# Patient Record
Sex: FEMALE | Race: BLACK OR AFRICAN AMERICAN | NOT HISPANIC OR LATINO | Employment: FULL TIME | URBAN - METROPOLITAN AREA
[De-identification: names, ages, dates, MRNs, and addresses within clinical notes are randomized per-mention and may not be internally consistent; named-entity substitution may affect disease eponyms.]

---

## 2023-12-29 ENCOUNTER — PROCEDURE VISIT (OUTPATIENT)
Dept: OBSTETRICS AND GYNECOLOGY | Facility: CLINIC | Age: 23
End: 2023-12-29
Payer: COMMERCIAL

## 2023-12-29 ENCOUNTER — PHARMACY VISIT (OUTPATIENT)
Dept: PHARMACY | Facility: CLINIC | Age: 23
End: 2023-12-29
Payer: MEDICAID

## 2023-12-29 VITALS
HEIGHT: 59 IN | HEART RATE: 83 BPM | WEIGHT: 111.9 LBS | SYSTOLIC BLOOD PRESSURE: 110 MMHG | DIASTOLIC BLOOD PRESSURE: 72 MMHG | BODY MASS INDEX: 22.56 KG/M2

## 2023-12-29 DIAGNOSIS — Z11.3 SCREEN FOR STD (SEXUALLY TRANSMITTED DISEASE): ICD-10-CM

## 2023-12-29 DIAGNOSIS — Z30.46 ENCOUNTER FOR REMOVAL AND REINSERTION OF NEXPLANON: Primary | ICD-10-CM

## 2023-12-29 DIAGNOSIS — A74.9 CHLAMYDIA: ICD-10-CM

## 2023-12-29 DIAGNOSIS — Z30.46 ENCOUNTER FOR SURVEILLANCE OF IMPLANTABLE SUBDERMAL CONTRACEPTIVE: ICD-10-CM

## 2023-12-29 DIAGNOSIS — A59.9 TRICHOMONAS INFECTION: ICD-10-CM

## 2023-12-29 DIAGNOSIS — N89.8 VAGINAL DISCHARGE: ICD-10-CM

## 2023-12-29 PROCEDURE — 87205 SMEAR GRAM STAIN: CPT | Performed by: ADVANCED PRACTICE MIDWIFE

## 2023-12-29 PROCEDURE — 87800 DETECT AGNT MULT DNA DIREC: CPT | Performed by: ADVANCED PRACTICE MIDWIFE

## 2023-12-29 PROCEDURE — 2500000004 HC RX 250 GENERAL PHARMACY W/ HCPCS (ALT 636 FOR OP/ED): Mod: SE | Performed by: ADVANCED PRACTICE MIDWIFE

## 2023-12-29 PROCEDURE — 99213 OFFICE O/P EST LOW 20 MIN: CPT | Performed by: ADVANCED PRACTICE MIDWIFE

## 2023-12-29 PROCEDURE — RXMED WILLOW AMBULATORY MEDICATION CHARGE

## 2023-12-29 PROCEDURE — 87661 TRICHOMONAS VAGINALIS AMPLIF: CPT | Mod: 59 | Performed by: ADVANCED PRACTICE MIDWIFE

## 2023-12-29 RX ORDER — IBUPROFEN 200 MG
TABLET ORAL EVERY 12 HOURS
COMMUNITY
Start: 2018-09-22 | End: 2024-01-23

## 2023-12-29 RX ORDER — ETONOGESTREL 68 MG/1
IMPLANT SUBCUTANEOUS
COMMUNITY

## 2023-12-29 RX ORDER — IBUPROFEN 600 MG/1
600 TABLET ORAL ONCE
Status: DISCONTINUED | OUTPATIENT
Start: 2023-12-29 | End: 2024-01-23

## 2023-12-29 RX ORDER — IBUPROFEN 600 MG/1
600 TABLET ORAL EVERY 6 HOURS PRN
Qty: 30 TABLET | Refills: 0 | Status: SHIPPED | OUTPATIENT
Start: 2023-12-29 | End: 2024-01-08

## 2023-12-29 RX ADMIN — ETONOGESTREL 1 EACH: 68 IMPLANT SUBCUTANEOUS at 15:31

## 2023-12-29 ASSESSMENT — ENCOUNTER SYMPTOMS
NEUROLOGICAL NEGATIVE: 1
RESPIRATORY NEGATIVE: 1
GASTROINTESTINAL NEGATIVE: 1
HEMATOLOGIC/LYMPHATIC NEGATIVE: 1
ENDOCRINE NEGATIVE: 1
CONSTITUTIONAL NEGATIVE: 1
MUSCULOSKELETAL NEGATIVE: 1
EYES NEGATIVE: 1
ALLERGIC/IMMUNOLOGIC NEGATIVE: 1
PSYCHIATRIC NEGATIVE: 1
CARDIOVASCULAR NEGATIVE: 1

## 2023-12-29 ASSESSMENT — PAIN SCALES - GENERAL: PAINLEVEL: 0-NO PAIN

## 2023-12-29 NOTE — PROGRESS NOTES
Patient ID: Reyes Manjarrez is a 23 y.o. female.    Subjective   Patient ID: Reyes Manjarrez is a 23 y.o. female who presents for No chief complaint on file..  HPI  Vaginal discharge x __ days    3 painful bumps on vulva    Desires STI testing    Nexplanon placed in 3/2020 by Onelia Davila       Review of Systems   Constitutional: Negative.    HENT: Negative.     Eyes: Negative.    Respiratory: Negative.     Cardiovascular: Negative.    Gastrointestinal: Negative.    Endocrine: Negative.    Genitourinary:  Positive for genital sores and vaginal discharge.   Musculoskeletal: Negative.    Skin: Negative.    Allergic/Immunologic: Negative.    Neurological: Negative.    Hematological: Negative.    Psychiatric/Behavioral: Negative.         Objective   Physical Exam    Patient ID: Reyes Manjarrez is a 23 y.o. female.    Procedures    Assessment/Plan   Problem List Items Addressed This Visit    None  Visit Diagnoses         Codes    Screen for STD (sexually transmitted disease)    -  Primary Z11.3    Relevant Orders    C. Trachomatis / N. Gonorrhoeae, Amplified Detection    HIV 1/2 Antigen/Antibody Screen with Reflex to Confirmation    Hepatitis B Surface Antigen    Hepatitis C Antibody    Syphilis Screen with Reflex    TRICH VAGINALIS, AMPLIFIED                 RITIKA Hannon 12/29/23 2:16 PM

## 2023-12-30 LAB
C TRACH RRNA SPEC QL NAA+PROBE: POSITIVE
N GONORRHOEA DNA SPEC QL PROBE+SIG AMP: POSITIVE
T VAGINALIS RRNA SPEC QL NAA+PROBE: POSITIVE

## 2024-01-01 LAB
CLUE CELLS VAG LPF-#/AREA: PRESENT /[LPF]
NUGENT SCORE: 8
YEAST VAG WET PREP-#/AREA: ABNORMAL

## 2024-01-02 ENCOUNTER — TELEPHONE (OUTPATIENT)
Dept: OBSTETRICS AND GYNECOLOGY | Facility: CLINIC | Age: 24
End: 2024-01-02
Payer: COMMERCIAL

## 2024-01-02 PROCEDURE — RXMED WILLOW AMBULATORY MEDICATION CHARGE

## 2024-01-02 RX ORDER — DOXYCYCLINE 100 MG/1
100 CAPSULE ORAL 2 TIMES DAILY
Qty: 14 CAPSULE | Refills: 0 | Status: SHIPPED | OUTPATIENT
Start: 2024-01-02 | End: 2024-01-10

## 2024-01-02 RX ORDER — METRONIDAZOLE 500 MG/1
500 TABLET ORAL 2 TIMES DAILY
Qty: 14 TABLET | Refills: 0 | Status: SHIPPED | OUTPATIENT
Start: 2024-01-02 | End: 2024-01-10

## 2024-01-02 NOTE — TELEPHONE ENCOUNTER
TELEPHONE CALL TO PATIENT  Identified by name and date of birth.  Patient notified of result, treatment, partner treatment  Partner has own provider for treatment  Instructed no unprotected sex x 7 days after treatment  Encouraged use of condoms routinely  Will be in tomorrow to get injection and  medications at the pharmacy  Patient verbalizes understanding

## 2024-01-02 NOTE — TELEPHONE ENCOUNTER
----- Message from Celestina Merida, APRN-CNM sent at 1/2/2024  9:58 AM EST -----  Reyes has 3 stis she needs to come for injection for gonorrhea all other meds are sent to her pharmcay

## 2024-01-03 ENCOUNTER — PHARMACY VISIT (OUTPATIENT)
Dept: PHARMACY | Facility: CLINIC | Age: 24
End: 2024-01-03
Payer: MEDICAID

## 2024-01-03 ENCOUNTER — OFFICE VISIT (OUTPATIENT)
Dept: OBSTETRICS AND GYNECOLOGY | Facility: CLINIC | Age: 24
End: 2024-01-03
Payer: COMMERCIAL

## 2024-01-03 DIAGNOSIS — A54.9 GONORRHEA: Primary | ICD-10-CM

## 2024-01-03 DIAGNOSIS — B37.9 YEAST INFECTION: ICD-10-CM

## 2024-01-03 DIAGNOSIS — Z11.3 SCREEN FOR STD (SEXUALLY TRANSMITTED DISEASE): ICD-10-CM

## 2024-01-03 PROCEDURE — 1036F TOBACCO NON-USER: CPT | Performed by: ADVANCED PRACTICE MIDWIFE

## 2024-01-03 PROCEDURE — 2500000004 HC RX 250 GENERAL PHARMACY W/ HCPCS (ALT 636 FOR OP/ED): Mod: SE | Performed by: ADVANCED PRACTICE MIDWIFE

## 2024-01-03 RX ORDER — CEFTRIAXONE 500 MG/1
500 INJECTION, POWDER, FOR SOLUTION INTRAMUSCULAR; INTRAVENOUS ONCE
Status: COMPLETED | OUTPATIENT
Start: 2024-01-03 | End: 2024-01-03

## 2024-01-03 RX ORDER — FLUCONAZOLE 150 MG/1
150 TABLET ORAL ONCE
Qty: 2 TABLET | Refills: 0 | Status: SHIPPED | OUTPATIENT
Start: 2024-01-03 | End: 2024-01-03

## 2024-01-03 RX ADMIN — CEFTRIAXONE SODIUM 500 MG: 500 INJECTION, POWDER, FOR SOLUTION INTRAMUSCULAR; INTRAVENOUS at 09:54

## 2024-01-03 ASSESSMENT — PATIENT HEALTH QUESTIONNAIRE - PHQ9
SUM OF ALL RESPONSES TO PHQ9 QUESTIONS 1 & 2: 0
2. FEELING DOWN, DEPRESSED OR HOPELESS: NOT AT ALL
1. LITTLE INTEREST OR PLEASURE IN DOING THINGS: NOT AT ALL

## 2024-01-03 NOTE — PROGRESS NOTES
Here for GC treatment  Tested positive for GC, CT, Trich, BV at 12/29 visit  Will  prescriptions today  Partner has been notified and will get treated  Discussed no unprotected sex x 7 days after treatment  Requesting Condom prescription , and Diflucan     Ceftriaxone 500mg IM given  Tolerated well  NKDA

## 2024-01-22 ENCOUNTER — APPOINTMENT (OUTPATIENT)
Dept: RADIOLOGY | Facility: HOSPITAL | Age: 24
End: 2024-01-22
Payer: COMMERCIAL

## 2024-01-22 ENCOUNTER — HOSPITAL ENCOUNTER (EMERGENCY)
Facility: HOSPITAL | Age: 24
Discharge: HOME | End: 2024-01-23
Attending: EMERGENCY MEDICINE
Payer: COMMERCIAL

## 2024-01-22 VITALS
WEIGHT: 100 LBS | SYSTOLIC BLOOD PRESSURE: 110 MMHG | OXYGEN SATURATION: 100 % | HEIGHT: 59 IN | BODY MASS INDEX: 20.16 KG/M2 | TEMPERATURE: 100 F | DIASTOLIC BLOOD PRESSURE: 54 MMHG | HEART RATE: 89 BPM | RESPIRATION RATE: 20 BRPM

## 2024-01-22 DIAGNOSIS — R52 BODY ACHES: Primary | ICD-10-CM

## 2024-01-22 LAB
ALBUMIN SERPL BCP-MCNC: 3.8 G/DL (ref 3.4–5)
ALP SERPL-CCNC: 64 U/L (ref 33–110)
ALT SERPL W P-5'-P-CCNC: 6 U/L (ref 7–45)
ANION GAP BLDV CALCULATED.4IONS-SCNC: ABNORMAL MMOL/L
ANION GAP SERPL CALC-SCNC: 14 MMOL/L (ref 10–20)
APPEARANCE UR: ABNORMAL
AST SERPL W P-5'-P-CCNC: 20 U/L (ref 9–39)
BASE EXCESS BLDV CALC-SCNC: -1.3 MMOL/L (ref -2–3)
BASOPHILS # BLD AUTO: 0.03 X10*3/UL (ref 0–0.1)
BASOPHILS NFR BLD AUTO: 0.3 %
BILIRUB SERPL-MCNC: 1.1 MG/DL (ref 0–1.2)
BILIRUB UR STRIP.AUTO-MCNC: NEGATIVE MG/DL
BODY TEMPERATURE: 37 DEGREES CELSIUS
BUN SERPL-MCNC: 13 MG/DL (ref 6–23)
CA-I BLDV-SCNC: ABNORMAL MMOL/L
CALCIUM SERPL-MCNC: 9.2 MG/DL (ref 8.6–10.6)
CARDIAC TROPONIN I PNL SERPL HS: <3 NG/L (ref 0–34)
CHLORIDE BLDV-SCNC: 107 MMOL/L (ref 98–107)
CHLORIDE SERPL-SCNC: 106 MMOL/L (ref 98–107)
CK SERPL-CCNC: 93 U/L (ref 0–215)
CO2 SERPL-SCNC: 22 MMOL/L (ref 21–32)
COLOR UR: YELLOW
CREAT SERPL-MCNC: 0.73 MG/DL (ref 0.5–1.05)
EGFRCR SERPLBLD CKD-EPI 2021: >90 ML/MIN/1.73M*2
EOSINOPHIL # BLD AUTO: 0.03 X10*3/UL (ref 0–0.7)
EOSINOPHIL NFR BLD AUTO: 0.3 %
ERYTHROCYTE [DISTWIDTH] IN BLOOD BY AUTOMATED COUNT: 14.6 % (ref 11.5–14.5)
FLUAV RNA RESP QL NAA+PROBE: NOT DETECTED
FLUBV RNA RESP QL NAA+PROBE: NOT DETECTED
GLUCOSE BLDV-MCNC: 90 MG/DL (ref 74–99)
GLUCOSE SERPL-MCNC: 88 MG/DL (ref 74–99)
GLUCOSE UR STRIP.AUTO-MCNC: NEGATIVE MG/DL
HCO3 BLDV-SCNC: 24.3 MMOL/L (ref 22–26)
HCT VFR BLD AUTO: 38.5 % (ref 36–46)
HCT VFR BLD EST: 41 % (ref 36–46)
HGB BLD-MCNC: 13.4 G/DL (ref 12–16)
HGB BLDV-MCNC: 13.8 G/DL (ref 12–16)
IMM GRANULOCYTES # BLD AUTO: 0.03 X10*3/UL (ref 0–0.7)
IMM GRANULOCYTES NFR BLD AUTO: 0.3 % (ref 0–0.9)
INHALED O2 CONCENTRATION: 21 %
KETONES UR STRIP.AUTO-MCNC: ABNORMAL MG/DL
LACTATE BLDV-SCNC: 1.8 MMOL/L (ref 0.4–2)
LEUKOCYTE ESTERASE UR QL STRIP.AUTO: ABNORMAL
LIPASE SERPL-CCNC: 10 U/L (ref 9–82)
LYMPHOCYTES # BLD AUTO: 0.94 X10*3/UL (ref 1.2–4.8)
LYMPHOCYTES NFR BLD AUTO: 8.5 %
MAGNESIUM SERPL-MCNC: 1.75 MG/DL (ref 1.6–2.4)
MCH RBC QN AUTO: 28.8 PG (ref 26–34)
MCHC RBC AUTO-ENTMCNC: 34.8 G/DL (ref 32–36)
MCV RBC AUTO: 83 FL (ref 80–100)
MONOCYTES # BLD AUTO: 0.74 X10*3/UL (ref 0.1–1)
MONOCYTES NFR BLD AUTO: 6.7 %
MUCOUS THREADS #/AREA URNS AUTO: NORMAL /LPF
NEUTROPHILS # BLD AUTO: 9.23 X10*3/UL (ref 1.2–7.7)
NEUTROPHILS NFR BLD AUTO: 83.9 %
NITRITE UR QL STRIP.AUTO: NEGATIVE
NRBC BLD-RTO: 0 /100 WBCS (ref 0–0)
OXYHGB MFR BLDV: 43.5 % (ref 45–75)
PCO2 BLDV: 43 MM HG (ref 41–51)
PH BLDV: 7.36 PH (ref 7.33–7.43)
PH UR STRIP.AUTO: 6 [PH]
PLATELET # BLD AUTO: 199 X10*3/UL (ref 150–450)
PO2 BLDV: 37 MM HG (ref 35–45)
POTASSIUM BLDV-SCNC: ABNORMAL MMOL/L
POTASSIUM SERPL-SCNC: 4.2 MMOL/L (ref 3.5–5.3)
PROT SERPL-MCNC: 6.9 G/DL (ref 6.4–8.2)
PROT UR STRIP.AUTO-MCNC: ABNORMAL MG/DL
RBC # BLD AUTO: 4.66 X10*6/UL (ref 4–5.2)
RBC # UR STRIP.AUTO: ABNORMAL /UL
RBC #/AREA URNS AUTO: NORMAL /HPF
RSV RNA RESP QL NAA+PROBE: NOT DETECTED
SAO2 % BLDV: 45 % (ref 45–75)
SARS-COV-2 RNA RESP QL NAA+PROBE: NOT DETECTED
SODIUM BLDV-SCNC: 126 MMOL/L (ref 136–145)
SODIUM SERPL-SCNC: 138 MMOL/L (ref 136–145)
SP GR UR STRIP.AUTO: 1.03
SQUAMOUS #/AREA URNS AUTO: NORMAL /HPF
UROBILINOGEN UR STRIP.AUTO-MCNC: <2 MG/DL
WBC # BLD AUTO: 11 X10*3/UL (ref 4.4–11.3)
WBC #/AREA URNS AUTO: NORMAL /HPF

## 2024-01-22 PROCEDURE — 71046 X-RAY EXAM CHEST 2 VIEWS: CPT

## 2024-01-22 PROCEDURE — 2500000004 HC RX 250 GENERAL PHARMACY W/ HCPCS (ALT 636 FOR OP/ED): Mod: SE | Performed by: STUDENT IN AN ORGANIZED HEALTH CARE EDUCATION/TRAINING PROGRAM

## 2024-01-22 PROCEDURE — 83735 ASSAY OF MAGNESIUM: CPT | Performed by: STUDENT IN AN ORGANIZED HEALTH CARE EDUCATION/TRAINING PROGRAM

## 2024-01-22 PROCEDURE — 84484 ASSAY OF TROPONIN QUANT: CPT | Performed by: STUDENT IN AN ORGANIZED HEALTH CARE EDUCATION/TRAINING PROGRAM

## 2024-01-22 PROCEDURE — 99284 EMERGENCY DEPT VISIT MOD MDM: CPT | Mod: 25

## 2024-01-22 PROCEDURE — 96374 THER/PROPH/DIAG INJ IV PUSH: CPT

## 2024-01-22 PROCEDURE — 83690 ASSAY OF LIPASE: CPT | Performed by: STUDENT IN AN ORGANIZED HEALTH CARE EDUCATION/TRAINING PROGRAM

## 2024-01-22 PROCEDURE — 81001 URINALYSIS AUTO W/SCOPE: CPT | Performed by: STUDENT IN AN ORGANIZED HEALTH CARE EDUCATION/TRAINING PROGRAM

## 2024-01-22 PROCEDURE — 85025 COMPLETE CBC W/AUTO DIFF WBC: CPT | Performed by: STUDENT IN AN ORGANIZED HEALTH CARE EDUCATION/TRAINING PROGRAM

## 2024-01-22 PROCEDURE — 87086 URINE CULTURE/COLONY COUNT: CPT | Performed by: STUDENT IN AN ORGANIZED HEALTH CARE EDUCATION/TRAINING PROGRAM

## 2024-01-22 PROCEDURE — 71046 X-RAY EXAM CHEST 2 VIEWS: CPT | Mod: FOREIGN READ | Performed by: RADIOLOGY

## 2024-01-22 PROCEDURE — 36415 COLL VENOUS BLD VENIPUNCTURE: CPT | Performed by: STUDENT IN AN ORGANIZED HEALTH CARE EDUCATION/TRAINING PROGRAM

## 2024-01-22 PROCEDURE — 87389 HIV-1 AG W/HIV-1&-2 AB AG IA: CPT | Performed by: STUDENT IN AN ORGANIZED HEALTH CARE EDUCATION/TRAINING PROGRAM

## 2024-01-22 PROCEDURE — 82550 ASSAY OF CK (CPK): CPT | Performed by: EMERGENCY MEDICINE

## 2024-01-22 PROCEDURE — 87637 SARSCOV2&INF A&B&RSV AMP PRB: CPT | Performed by: STUDENT IN AN ORGANIZED HEALTH CARE EDUCATION/TRAINING PROGRAM

## 2024-01-22 PROCEDURE — 84132 ASSAY OF SERUM POTASSIUM: CPT | Performed by: STUDENT IN AN ORGANIZED HEALTH CARE EDUCATION/TRAINING PROGRAM

## 2024-01-22 PROCEDURE — 99284 EMERGENCY DEPT VISIT MOD MDM: CPT | Performed by: EMERGENCY MEDICINE

## 2024-01-22 RX ORDER — ACETAMINOPHEN 325 MG/1
650 TABLET ORAL ONCE
Status: COMPLETED | OUTPATIENT
Start: 2024-01-22 | End: 2024-01-22

## 2024-01-22 RX ORDER — KETOROLAC TROMETHAMINE 15 MG/ML
15 INJECTION, SOLUTION INTRAMUSCULAR; INTRAVENOUS ONCE
Status: COMPLETED | OUTPATIENT
Start: 2024-01-22 | End: 2024-01-22

## 2024-01-22 RX ADMIN — ACETAMINOPHEN 650 MG: 325 TABLET ORAL at 22:57

## 2024-01-22 RX ADMIN — KETOROLAC TROMETHAMINE 15 MG: 15 INJECTION, SOLUTION INTRAMUSCULAR; INTRAVENOUS at 22:57

## 2024-01-22 ASSESSMENT — PAIN DESCRIPTION - PROGRESSION: CLINICAL_PROGRESSION: GRADUALLY IMPROVING

## 2024-01-22 ASSESSMENT — PAIN DESCRIPTION - PAIN TYPE: TYPE: CHRONIC PAIN;ACUTE PAIN

## 2024-01-22 ASSESSMENT — LIFESTYLE VARIABLES
EVER HAD A DRINK FIRST THING IN THE MORNING TO STEADY YOUR NERVES TO GET RID OF A HANGOVER: NO
EVER FELT BAD OR GUILTY ABOUT YOUR DRINKING: NO
HAVE YOU EVER FELT YOU SHOULD CUT DOWN ON YOUR DRINKING: NO
HAVE PEOPLE ANNOYED YOU BY CRITICIZING YOUR DRINKING: NO
REASON UNABLE TO ASSESS: NO

## 2024-01-22 ASSESSMENT — PAIN SCALES - GENERAL: PAINLEVEL_OUTOF10: 7

## 2024-01-22 ASSESSMENT — PAIN DESCRIPTION - LOCATION: LOCATION: BACK

## 2024-01-22 ASSESSMENT — COLUMBIA-SUICIDE SEVERITY RATING SCALE - C-SSRS
6. HAVE YOU EVER DONE ANYTHING, STARTED TO DO ANYTHING, OR PREPARED TO DO ANYTHING TO END YOUR LIFE?: NO
1. IN THE PAST MONTH, HAVE YOU WISHED YOU WERE DEAD OR WISHED YOU COULD GO TO SLEEP AND NOT WAKE UP?: NO
2. HAVE YOU ACTUALLY HAD ANY THOUGHTS OF KILLING YOURSELF?: NO

## 2024-01-22 ASSESSMENT — PAIN DESCRIPTION - DESCRIPTORS: DESCRIPTORS: ACHING

## 2024-01-22 ASSESSMENT — PAIN - FUNCTIONAL ASSESSMENT: PAIN_FUNCTIONAL_ASSESSMENT: 0-10

## 2024-01-22 NOTE — Clinical Note
Reyes Manjarrez was seen and treated in our emergency department on 1/22/2024.  She may return to work on 01/26/2024.       If you have any questions or concerns, please don't hesitate to call.      Cierra Clark MD none

## 2024-01-22 NOTE — Clinical Note
Reyes Manjarrez was seen and treated in our emergency department on 1/22/2024.  She may return to work on 01/24/2024.       If you have any questions or concerns, please don't hesitate to call.      Cierra Clark MD

## 2024-01-23 LAB
BACTERIA UR CULT: NORMAL
HIV 1+2 AB+HIV1 P24 AG SERPL QL IA: NONREACTIVE
HOLD SPECIMEN: NORMAL
PREGNANCY TEST URINE, POC: NEGATIVE

## 2024-01-23 PROCEDURE — 81025 URINE PREGNANCY TEST: CPT | Performed by: STUDENT IN AN ORGANIZED HEALTH CARE EDUCATION/TRAINING PROGRAM

## 2024-01-23 RX ORDER — ONDANSETRON 4 MG/1
4 TABLET, FILM COATED ORAL EVERY 8 HOURS PRN
Qty: 30 TABLET | Refills: 0 | Status: SHIPPED | OUTPATIENT
Start: 2024-01-23

## 2024-01-23 RX ORDER — IBUPROFEN 600 MG/1
600 TABLET ORAL EVERY 8 HOURS PRN
Qty: 30 TABLET | Refills: 0 | Status: SHIPPED | OUTPATIENT
Start: 2024-01-23

## 2024-01-23 RX ORDER — ACETAMINOPHEN 325 MG/1
975 TABLET ORAL ONCE
Status: COMPLETED | OUTPATIENT
Start: 2024-01-23 | End: 2024-01-23

## 2024-01-23 RX ORDER — ACETAMINOPHEN 325 MG/1
650 TABLET ORAL EVERY 6 HOURS PRN
Qty: 30 TABLET | Refills: 0 | Status: SHIPPED | OUTPATIENT
Start: 2024-01-23

## 2024-01-23 RX ADMIN — ACETAMINOPHEN 975 MG: 325 TABLET ORAL at 00:50

## 2024-01-23 NOTE — ED TRIAGE NOTES
Pt to ED with c/o all over body pain, especially back pain, since last night. Hx of scoliosis with spinal fusion in 2014.

## 2024-01-23 NOTE — ED PROVIDER NOTES
HPI   Chief Complaint   Patient presents with    Generalized Body Aches    Back Pain       HPI     Patient is a 23-year-old female with a past medical history of depression, scoliosis s/p spinal fusion 2014 presenting to the emergency department with bodyaches.  Patient states that she has had several days worth of intermittent chills, body aches, muscle pain, sore throat, nausea without vomiting, GI upset, occasional cough.  States that the pain is somewhat worse on her lumbar spine where she has had spinal surgeries in the past, but is diffuse everywhere.  Denies any pain with urination, blood in urine or stool.  Is additionally requesting STD testing as she is sexually active.  Denies any active symptoms of pelvic pain, vaginal discharge, vaginal bleeding.  States she uses condoms as contraception.               [place] Coma Scale Score: 15                  Patient History   Past Medical History:   Diagnosis Date    Abnormal weight loss 10/21/2020    Weight loss    Adolescent idiopathic scoliosis, site unspecified 12/16/2020    Adolescent idiopathic scoliosis    Chlamydial infection, unspecified 02/17/2020    Chlamydia    Depression, unspecified 05/05/2022    Depression    Encounter for gynecological examination (general) (routine) without abnormal findings 05/06/2019    Encounter for well woman exam with routine gynecological exam    Encounter for pregnancy test, result negative 04/10/2019    Negative pregnancy test    Encounter for surveillance of injectable contraceptive 12/16/2019    Encounter for Depo-Provera contraception    Low back pain, unspecified 02/19/2021    Lower back pain    Lumbago with sciatica, right side 07/28/2022    Chronic midline low back pain with right-sided sciatica    Personal history of other diseases of the female genital tract 09/17/2019    History of menstrual disorder    Personal history of other infectious and parasitic diseases     History of trichomoniasis    Personal history of  other specified conditions 12/03/2020    History of urinary frequency    Personal history of other specified conditions 08/11/2020    History of dysuria    Personal history of urinary (tract) infections 08/11/2020    History of urinary tract infection     Past Surgical History:   Procedure Laterality Date    OTHER SURGICAL HISTORY  05/11/2015    Posterior Segmental Instrumentation Total ___(3-6) Vertebrae    OTHER SURGICAL HISTORY  05/11/2015    Arthrodesis For Deform Post Ap Total ___ (1-6) Vertebrae    OTHER SURGICAL HISTORY  05/11/2015    Spinal Surgery - Autograft Local (Same Incision)    OTHER SURGICAL HISTORY  05/11/2015    Spinal Surgery - Allograft Morselized     No family history on file.  Social History     Tobacco Use    Smoking status: Never     Passive exposure: Never    Smokeless tobacco: Never   Vaping Use    Vaping Use: Never used   Substance Use Topics    Alcohol use: Never    Drug use: Never       Physical Exam   ED Triage Vitals [01/22/24 2102]   Temp Heart Rate Respirations BP   37.8 °C (100 °F) 89 20 110/54      Pulse Ox Temp Source Heart Rate Source Patient Position   100 % Temporal -- --      BP Location FiO2 (%)     -- --       Physical Exam  Constitutional:       Appearance: She is not toxic-appearing or diaphoretic.   HENT:      Head: Normocephalic and atraumatic.      Nose: Nose normal.      Mouth/Throat:      Mouth: Mucous membranes are moist.      Pharynx: Posterior oropharyngeal erythema present. No oropharyngeal exudate.   Eyes:      General: No scleral icterus.        Right eye: No discharge.         Left eye: No discharge.      Conjunctiva/sclera: Conjunctivae normal.   Cardiovascular:      Rate and Rhythm: Normal rate.      Heart sounds: No murmur heard.     No friction rub. No gallop.   Pulmonary:      Effort: No respiratory distress.      Breath sounds: Normal breath sounds.   Abdominal:      General: There is no distension.      Palpations: Abdomen is soft.   Musculoskeletal:          General: No deformity or signs of injury.      Cervical back: Neck supple. No rigidity.      Comments: Diffusely tender to palpation over her bilateral arms, legs, and thoracic/lumbar paraspinal muscles.  Old and well-healed scars over her spinal midline.   Skin:     General: Skin is warm and dry.   Neurological:      Mental Status: She is alert.      Comments: Ambulates normally.  Moves all 4 extremities without deficit.  Intact sensation to light touch in all 4 extremities.   Psychiatric:         Mood and Affect: Mood normal.         Behavior: Behavior normal.         ED Course & MDM   Diagnoses as of 01/23/24 0239   Body aches       Medical Decision Making  Patient is a 23-year-old female presenting to the emergency department generalized body aches.  Was tested for viral illnesses including COVID, flu which were negative.  Patient having no new oxygen requirement.  Chest x-ray and UA without obvious bacterial infection.  Troponin was negative and CK was negative with low clinical concern for ACS or rhabdomyolysis.  Patient was not pregnant.  Labs otherwise reassuring with no evidence of new anemia or significant electrolyte abnormalities.  Given that patient is overall generally healthy, not showing any new focal neurologic signs, I do have a much lower clinical suspicion for meningitis.  I did discuss the possibility of meningitis with the patient as well as strict return precautions for evidence of worsening headache, neck tenderness, altered mental status, confusion, or lethargy.  Patient voiced understanding to this.  Do suspect the patient symptoms are most likely secondary to a viral illness.  Patient will be discharged with instructions to take Tylenol, ibuprofen, and Zofran as well as return precautions as above.  Told to follow-up with the PCP as needed if symptoms persist or worsen.    Procedure  Procedures     Vicente Delgadillo MD  Resident  01/23/24 [id number]

## 2024-01-29 ENCOUNTER — CLINICAL SUPPORT (OUTPATIENT)
Dept: OBSTETRICS AND GYNECOLOGY | Facility: CLINIC | Age: 24
End: 2024-01-29
Payer: COMMERCIAL

## 2024-01-29 DIAGNOSIS — A64 STD (SEXUALLY TRANSMITTED DISEASE): Primary | ICD-10-CM

## 2024-01-29 PROCEDURE — 87661 TRICHOMONAS VAGINALIS AMPLIF: CPT | Mod: 59

## 2024-01-29 PROCEDURE — 87800 DETECT AGNT MULT DNA DIREC: CPT

## 2024-01-29 NOTE — PROGRESS NOTES
Pt treated for GC, CT abd trich 1/3/2024 here for REY. No other concerns. Urine sent. Will follow up results.

## 2024-01-30 LAB
C TRACH RRNA SPEC QL NAA+PROBE: NEGATIVE
N GONORRHOEA DNA SPEC QL PROBE+SIG AMP: NEGATIVE
T VAGINALIS RRNA SPEC QL NAA+PROBE: NEGATIVE

## 2024-06-21 ENCOUNTER — LAB (OUTPATIENT)
Dept: LAB | Facility: LAB | Age: 24
End: 2024-06-21
Payer: COMMERCIAL

## 2024-06-21 ENCOUNTER — OFFICE VISIT (OUTPATIENT)
Dept: OBSTETRICS AND GYNECOLOGY | Facility: CLINIC | Age: 24
End: 2024-06-21
Payer: COMMERCIAL

## 2024-06-21 VITALS
HEART RATE: 69 BPM | WEIGHT: 113 LBS | BODY MASS INDEX: 22.82 KG/M2 | DIASTOLIC BLOOD PRESSURE: 73 MMHG | SYSTOLIC BLOOD PRESSURE: 114 MMHG

## 2024-06-21 DIAGNOSIS — Z11.3 SCREEN FOR STD (SEXUALLY TRANSMITTED DISEASE): Primary | ICD-10-CM

## 2024-06-21 DIAGNOSIS — T14.8XXA ABRASION: ICD-10-CM

## 2024-06-21 DIAGNOSIS — Z11.3 SCREEN FOR STD (SEXUALLY TRANSMITTED DISEASE): ICD-10-CM

## 2024-06-21 DIAGNOSIS — Z00.00 HEALTHCARE MAINTENANCE: ICD-10-CM

## 2024-06-21 LAB
HBV SURFACE AG SERPL QL IA: NONREACTIVE
HCV AB SER QL: NONREACTIVE
HIV 1+2 AB+HIV1 P24 AG SERPL QL IA: NONREACTIVE
TREPONEMA PALLIDUM IGG+IGM AB [PRESENCE] IN SERUM OR PLASMA BY IMMUNOASSAY: NONREACTIVE

## 2024-06-21 PROCEDURE — 36415 COLL VENOUS BLD VENIPUNCTURE: CPT

## 2024-06-21 PROCEDURE — 1036F TOBACCO NON-USER: CPT | Performed by: STUDENT IN AN ORGANIZED HEALTH CARE EDUCATION/TRAINING PROGRAM

## 2024-06-21 PROCEDURE — 87389 HIV-1 AG W/HIV-1&-2 AB AG IA: CPT

## 2024-06-21 PROCEDURE — 99395 PREV VISIT EST AGE 18-39: CPT | Performed by: STUDENT IN AN ORGANIZED HEALTH CARE EDUCATION/TRAINING PROGRAM

## 2024-06-21 PROCEDURE — 86780 TREPONEMA PALLIDUM: CPT

## 2024-06-21 PROCEDURE — 86803 HEPATITIS C AB TEST: CPT

## 2024-06-21 PROCEDURE — 87340 HEPATITIS B SURFACE AG IA: CPT

## 2024-06-21 PROCEDURE — 87491 CHLMYD TRACH DNA AMP PROBE: CPT | Performed by: STUDENT IN AN ORGANIZED HEALTH CARE EDUCATION/TRAINING PROGRAM

## 2024-06-21 PROCEDURE — 87661 TRICHOMONAS VAGINALIS AMPLIF: CPT | Performed by: STUDENT IN AN ORGANIZED HEALTH CARE EDUCATION/TRAINING PROGRAM

## 2024-06-21 RX ORDER — NAPROXEN 375 MG/1
375 TABLET ORAL
Qty: 60 TABLET | Refills: 11 | Status: SHIPPED | OUTPATIENT
Start: 2024-06-21 | End: 2025-06-21

## 2024-06-21 RX ORDER — HYDROXYZINE HYDROCHLORIDE 25 MG/1
25 TABLET, FILM COATED ORAL 2 TIMES DAILY PRN
COMMUNITY
Start: 2024-03-21

## 2024-06-21 ASSESSMENT — PAIN SCALES - GENERAL: PAINLEVEL: 6
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
## 2024-06-21 NOTE — ASSESSMENT & PLAN NOTE
- Reviewed healthy lifestyle including well rounded diet and exercise (moderate intensity 150min/week; high intensity 75min/week)  - Immunizations: UTD  - Pap UTD  - STI testing at patient request   - Reviewed avoidance of shaving to minimize ingrown hairs  - Reviewed physiologic discharge expectations and when to be concerned about infection. No evidence of abnormalities today.

## 2024-08-17 ENCOUNTER — HOSPITAL ENCOUNTER (EMERGENCY)
Facility: HOSPITAL | Age: 24
Discharge: HOME | End: 2024-08-17
Attending: EMERGENCY MEDICINE
Payer: COMMERCIAL

## 2024-08-17 VITALS
DIASTOLIC BLOOD PRESSURE: 71 MMHG | WEIGHT: 114.64 LBS | TEMPERATURE: 97.7 F | HEIGHT: 59 IN | RESPIRATION RATE: 18 BRPM | HEART RATE: 88 BPM | OXYGEN SATURATION: 99 % | SYSTOLIC BLOOD PRESSURE: 110 MMHG | BODY MASS INDEX: 23.11 KG/M2

## 2024-08-17 DIAGNOSIS — Z20.2 POTENTIAL EXPOSURE TO STD: Primary | ICD-10-CM

## 2024-08-17 PROCEDURE — 99281 EMR DPT VST MAYX REQ PHY/QHP: CPT

## 2024-08-17 ASSESSMENT — COLUMBIA-SUICIDE SEVERITY RATING SCALE - C-SSRS
1. IN THE PAST MONTH, HAVE YOU WISHED YOU WERE DEAD OR WISHED YOU COULD GO TO SLEEP AND NOT WAKE UP?: NO
6. HAVE YOU EVER DONE ANYTHING, STARTED TO DO ANYTHING, OR PREPARED TO DO ANYTHING TO END YOUR LIFE?: NO
2. HAVE YOU ACTUALLY HAD ANY THOUGHTS OF KILLING YOURSELF?: NO

## 2024-08-17 ASSESSMENT — PAIN - FUNCTIONAL ASSESSMENT: PAIN_FUNCTIONAL_ASSESSMENT: 0-10

## 2024-08-17 ASSESSMENT — PAIN SCALES - GENERAL
PAINLEVEL_OUTOF10: 0 - NO PAIN
PAINLEVEL_OUTOF10: 0 - NO PAIN

## 2024-08-17 NOTE — DISCHARGE INSTRUCTIONS
Follow-up with your primary care physician or OB/GYN within 1 week for comprehensive STD screening.  Refrain from unprotected sexual intercourse until you are seen and cleared by your provider.      Return to the emergency department sooner with worsening of symptoms or onset of new symptoms

## 2024-08-17 NOTE — ED PROVIDER NOTES
HPI   Chief Complaint   Patient presents with    Exposure to STD     No known exposure to STDs.       HPI        Patient History   Past Medical History:   Diagnosis Date    Abnormal weight loss 10/21/2020    Weight loss    Adolescent idiopathic scoliosis, site unspecified 12/16/2020    Adolescent idiopathic scoliosis    Chlamydial infection, unspecified 02/17/2020    Chlamydia    Depression, unspecified 05/05/2022    Depression    Encounter for gynecological examination (general) (routine) without abnormal findings 05/06/2019    Encounter for well woman exam with routine gynecological exam    Encounter for pregnancy test, result negative 04/10/2019    Negative pregnancy test    Encounter for surveillance of injectable contraceptive 12/16/2019    Encounter for Depo-Provera contraception    Low back pain, unspecified 02/19/2021    Lower back pain    Lumbago with sciatica, right side 07/28/2022    Chronic midline low back pain with right-sided sciatica    Personal history of other diseases of the female genital tract 09/17/2019    History of menstrual disorder    Personal history of other infectious and parasitic diseases     History of trichomoniasis    Personal history of other specified conditions 12/03/2020    History of urinary frequency    Personal history of other specified conditions 08/11/2020    History of dysuria    Personal history of urinary (tract) infections 08/11/2020    History of urinary tract infection     Past Surgical History:   Procedure Laterality Date    OTHER SURGICAL HISTORY  05/11/2015    Posterior Segmental Instrumentation Total ___(3-6) Vertebrae    OTHER SURGICAL HISTORY  05/11/2015    Arthrodesis For Deform Post Ap Total ___ (1-6) Vertebrae    OTHER SURGICAL HISTORY  05/11/2015    Spinal Surgery - Autograft Local (Same Incision)    OTHER SURGICAL HISTORY  05/11/2015    Spinal Surgery - Allograft Morselized     No family history on file.  Social History     Tobacco Use    Smoking status:  Never     Passive exposure: Never    Smokeless tobacco: Never   Vaping Use    Vaping status: Never Used   Substance Use Topics    Alcohol use: Never    Drug use: Never       Physical Exam   ED Triage Vitals [08/17/24 1051]   Temperature Heart Rate Respirations BP   36.5 °C (97.7 °F) 95 18 110/71      SpO2 Temp Source Heart Rate Source Patient Position   -- Temporal -- Sitting      BP Location FiO2 (%)     Right arm --       Physical Exam      ED Course & MDM   Diagnoses as of 08/17/24 1822   Potential exposure to STD                 No data recorded     [place] Coma Scale Score: 15 (08/17/24 1050 : Anila Romero, REY)                           Medical Decision Making    The patient is a 23-year-old female presenting to the emergency department to be evaluated for possible STD exposure.  The patient states that she does not really have any symptoms other than a little bit of vaginal itching.  She states that she recently was with a new partner and he was with a lot of other people but he did not have any symptoms and she has not had any other symptoms.  She states that she does not really think that her symptoms are significant but she does not know if she should be tested for all STDs because she knows that he has been with other people.  She denies any headache or visual changes.  No chest pain or shortness of breath.  No abdominal pain.  No nausea or vomiting.  No diarrhea or constipation.  No vaginal discharge.  No fever or chills.  No weakness or numbness.  All pertinent positives and negatives are recorded above.  All other systems reviewed and otherwise negative.  Vital signs within normal limits.  Physical exam with a well-nourished well-developed female in no acute distress.  HEENT exam within normal limits.  She has no evidence of airway compromise or respiratory distress.  Abdominal exam is benign.  She does not have any gross motor, neurologic or vascular deficits on exam.  She did decline pelvic  exam.      I did discuss all options with the patient.  She does not want to be presumptively treated for STDs as she feels that she does not have any significant symptoms at this time and just wants comprehensive STD screening for all things including HIV/hepatitis, syphilis, gonorrhea and chlamydia because of her being with a new partner.  She declined pelvic exam in the emergency room.  I did recommend that she follow-up with her OB/GYN and/or primary care physician within the next several days to have comprehensive STD screening due to her concerns of her partner being promiscuous.  Given her lack of symptoms at this time, I do not feel that she needs presumptive treatment for STDs and/or any urgent diagnostic labs and/or imaging.  She does agree with the plan to follow-up with her primary care physician and/or OB/GYN for this.  She was agreeable to receiving referrals to have the testing done.      The patient was released in good condition.  She was instructed to follow-up with her primary care physician and/or OB/GYN within 1 week for comprehensive is the screening.  She will refrain from having unprotected sexual intercourse until she is seen and cleared by her doctor.  She will return to the emergency department with any worsening of symptoms or onset of new symptoms      Impression/diagnosis  Encounter for possible exposure to STD      Emergent imaging and/or diagnostic testing is not indicated this time    Procedure  Procedures     Jaja Ryao MD  08/17/24 [id number]       Jaja Rayo MD  08/17/24 1566

## 2024-08-17 NOTE — Clinical Note
Reyes Manjarrez was seen and treated in our emergency department on 8/17/2024.  She may return to work on 08/17/2024.       If you have any questions or concerns, please don't hesitate to call.      Jaja Rayo MD

## 2024-09-26 ENCOUNTER — APPOINTMENT (OUTPATIENT)
Dept: OBSTETRICS AND GYNECOLOGY | Facility: CLINIC | Age: 24
End: 2024-09-26
Payer: COMMERCIAL

## 2024-09-26 VITALS
SYSTOLIC BLOOD PRESSURE: 102 MMHG | HEIGHT: 59 IN | DIASTOLIC BLOOD PRESSURE: 64 MMHG | BODY MASS INDEX: 22.98 KG/M2 | WEIGHT: 114 LBS

## 2024-09-26 DIAGNOSIS — N94.9 VAGINAL BURNING: ICD-10-CM

## 2024-09-26 PROCEDURE — 99213 OFFICE O/P EST LOW 20 MIN: CPT | Performed by: OBSTETRICS & GYNECOLOGY

## 2024-09-26 PROCEDURE — 87491 CHLMYD TRACH DNA AMP PROBE: CPT

## 2024-09-26 PROCEDURE — 3008F BODY MASS INDEX DOCD: CPT | Performed by: OBSTETRICS & GYNECOLOGY

## 2024-09-26 PROCEDURE — 87661 TRICHOMONAS VAGINALIS AMPLIF: CPT

## 2024-09-26 PROCEDURE — 87591 N.GONORRHOEAE DNA AMP PROB: CPT

## 2024-09-26 PROCEDURE — 1036F TOBACCO NON-USER: CPT | Performed by: OBSTETRICS & GYNECOLOGY

## 2024-09-26 ASSESSMENT — PAIN SCALES - GENERAL: PAINLEVEL: 0-NO PAIN

## 2024-09-26 NOTE — PROGRESS NOTES
Subjective   Patient ID: Reyes Manjarrez is a 24 y.o. female who presents for New Patient Visit (New Patient//C/o  burning sensation between vagina and buttock, swollen lymph node/- check stds/- random bumps on chest and neck//Chaperone declined).  HPI  As contained in the chief complaint  Review of Systems  10 systems have been reviewed and are negative and noncontributory to the patient current ailments.    Objective   Physical Exam  Vital signs reviewed    CONSTITUTIONAL- well nourished, well developed, looks like stated age.    ABDOMEN- soft, non distended, bowel sound normal pitch and intensity, no lymphadenopathy  GENITOURINARY- External genitalia: Normal.                                 - Uterus: AV/AF NSSC, mobile and nontender                                -The adnexal areas are free of tenderness or mass                                -There are no cervical lesions; there is no cervical motion tenderness                                -Vagina without lesions, mucosa pink and well-hydrated, discharge is physiologic.  Assessment/Plan   Diagnoses and all orders for this visit:  Vaginal burning  -     C. Trachomatis / N. Gonorrhoeae, Amplified Detection  -     Trichomonas vaginalis, Nucleic Acid Detection           Conrad Thompson DO 09/26/24 3:13 PM

## 2024-09-27 ENCOUNTER — TELEPHONE (OUTPATIENT)
Dept: OBSTETRICS AND GYNECOLOGY | Facility: CLINIC | Age: 24
End: 2024-09-27
Payer: COMMERCIAL

## 2024-09-27 DIAGNOSIS — A74.9 CHLAMYDIA: Primary | ICD-10-CM

## 2024-09-27 LAB
C TRACH RRNA SPEC QL NAA+PROBE: POSITIVE
N GONORRHOEA DNA SPEC QL PROBE+SIG AMP: NEGATIVE
T VAGINALIS RRNA SPEC QL NAA+PROBE: NEGATIVE

## 2024-09-27 RX ORDER — DOXYCYCLINE 100 MG/1
CAPSULE ORAL
Qty: 14 CAPSULE | Refills: 0 | Status: SHIPPED | OUTPATIENT
Start: 2024-09-27

## 2024-10-18 ENCOUNTER — APPOINTMENT (OUTPATIENT)
Dept: OBSTETRICS AND GYNECOLOGY | Facility: CLINIC | Age: 24
End: 2024-10-18
Payer: COMMERCIAL

## 2024-10-30 ENCOUNTER — OFFICE VISIT (OUTPATIENT)
Dept: OBSTETRICS AND GYNECOLOGY | Facility: CLINIC | Age: 24
End: 2024-10-30
Payer: COMMERCIAL

## 2024-10-30 VITALS
BODY MASS INDEX: 22.34 KG/M2 | DIASTOLIC BLOOD PRESSURE: 58 MMHG | HEIGHT: 59 IN | WEIGHT: 110.8 LBS | SYSTOLIC BLOOD PRESSURE: 100 MMHG

## 2024-10-30 DIAGNOSIS — Z11.3 ENCOUNTER FOR SCREENING EXAMINATION FOR SEXUALLY TRANSMITTED INFECTION: Primary | ICD-10-CM

## 2024-10-30 PROCEDURE — 87591 N.GONORRHOEAE DNA AMP PROB: CPT

## 2024-10-30 PROCEDURE — 3008F BODY MASS INDEX DOCD: CPT | Performed by: ADVANCED PRACTICE MIDWIFE

## 2024-10-30 PROCEDURE — 99213 OFFICE O/P EST LOW 20 MIN: CPT | Performed by: ADVANCED PRACTICE MIDWIFE

## 2024-10-30 PROCEDURE — 87491 CHLMYD TRACH DNA AMP PROBE: CPT

## 2024-10-30 ASSESSMENT — ENCOUNTER SYMPTOMS
NEUROLOGICAL NEGATIVE: 1
FEVER: 0
FLANK PAIN: 0
APPETITE CHANGE: 0
DIFFICULTY URINATING: 0
BRUISES/BLEEDS EASILY: 1
CHILLS: 0
PSYCHIATRIC NEGATIVE: 1
GASTROINTESTINAL NEGATIVE: 1
CARDIOVASCULAR NEGATIVE: 1
HEMATURIA: 0
ACTIVITY CHANGE: 0
UNEXPECTED WEIGHT CHANGE: 0
FATIGUE: 0
DYSURIA: 0
RESPIRATORY NEGATIVE: 1
MUSCULOSKELETAL NEGATIVE: 1
DIAPHORESIS: 0
WOUND: 1
FREQUENCY: 0

## 2024-10-30 ASSESSMENT — PAIN SCALES - GENERAL: PAINLEVEL_OUTOF10: 0-NO PAIN

## 2024-11-02 LAB
C TRACH RRNA SPEC QL NAA+PROBE: NEGATIVE
N GONORRHOEA DNA SPEC QL PROBE+SIG AMP: NEGATIVE

## 2024-12-10 ENCOUNTER — APPOINTMENT (OUTPATIENT)
Dept: OBSTETRICS AND GYNECOLOGY | Facility: CLINIC | Age: 24
End: 2024-12-10
Payer: COMMERCIAL

## 2024-12-10 ENCOUNTER — LAB (OUTPATIENT)
Dept: LAB | Facility: LAB | Age: 24
End: 2024-12-10
Payer: COMMERCIAL

## 2024-12-10 VITALS
HEIGHT: 59 IN | WEIGHT: 109 LBS | DIASTOLIC BLOOD PRESSURE: 60 MMHG | BODY MASS INDEX: 21.97 KG/M2 | SYSTOLIC BLOOD PRESSURE: 108 MMHG

## 2024-12-10 DIAGNOSIS — Z71.1 CONCERN ABOUT STD IN FEMALE WITHOUT DIAGNOSIS: Primary | ICD-10-CM

## 2024-12-10 DIAGNOSIS — Z11.3 ENCOUNTER FOR SCREENING EXAMINATION FOR SEXUALLY TRANSMITTED INFECTION: ICD-10-CM

## 2024-12-10 DIAGNOSIS — Z71.1 CONCERN ABOUT STD IN FEMALE WITHOUT DIAGNOSIS: ICD-10-CM

## 2024-12-10 LAB
ABO GROUP (TYPE) IN BLOOD: NORMAL
ANTIBODY SCREEN: NORMAL
ERYTHROCYTE [DISTWIDTH] IN BLOOD BY AUTOMATED COUNT: 14.1 % (ref 11.5–14.5)
HBV SURFACE AG SERPL QL IA: NONREACTIVE
HCT VFR BLD AUTO: 42.5 % (ref 36–46)
HCV AB SER QL: NONREACTIVE
HGB BLD-MCNC: 13.9 G/DL (ref 12–16)
HIV 1+2 AB+HIV1 P24 AG SERPL QL IA: NONREACTIVE
MCH RBC QN AUTO: 28.6 PG (ref 26–34)
MCHC RBC AUTO-ENTMCNC: 32.7 G/DL (ref 32–36)
MCV RBC AUTO: 87 FL (ref 80–100)
NRBC BLD-RTO: 0 /100 WBCS (ref 0–0)
PLATELET # BLD AUTO: 263 X10*3/UL (ref 150–450)
RBC # BLD AUTO: 4.86 X10*6/UL (ref 4–5.2)
REFLEX ADDED, ANEMIA PANEL: NORMAL
RH FACTOR (ANTIGEN D): NORMAL
TREPONEMA PALLIDUM IGG+IGM AB [PRESENCE] IN SERUM OR PLASMA BY IMMUNOASSAY: NONREACTIVE
WBC # BLD AUTO: 4.1 X10*3/UL (ref 4.4–11.3)

## 2024-12-10 PROCEDURE — 86803 HEPATITIS C AB TEST: CPT

## 2024-12-10 PROCEDURE — 85027 COMPLETE CBC AUTOMATED: CPT

## 2024-12-10 PROCEDURE — 86900 BLOOD TYPING SEROLOGIC ABO: CPT

## 2024-12-10 PROCEDURE — 99214 OFFICE O/P EST MOD 30 MIN: CPT | Performed by: OBSTETRICS & GYNECOLOGY

## 2024-12-10 PROCEDURE — 86850 RBC ANTIBODY SCREEN: CPT

## 2024-12-10 PROCEDURE — 3008F BODY MASS INDEX DOCD: CPT | Performed by: OBSTETRICS & GYNECOLOGY

## 2024-12-10 PROCEDURE — 87591 N.GONORRHOEAE DNA AMP PROB: CPT

## 2024-12-10 PROCEDURE — 86901 BLOOD TYPING SEROLOGIC RH(D): CPT

## 2024-12-10 PROCEDURE — 87340 HEPATITIS B SURFACE AG IA: CPT

## 2024-12-10 PROCEDURE — 36415 COLL VENOUS BLD VENIPUNCTURE: CPT

## 2024-12-10 PROCEDURE — 87661 TRICHOMONAS VAGINALIS AMPLIF: CPT

## 2024-12-10 PROCEDURE — 87491 CHLMYD TRACH DNA AMP PROBE: CPT

## 2024-12-10 PROCEDURE — 1036F TOBACCO NON-USER: CPT | Performed by: OBSTETRICS & GYNECOLOGY

## 2024-12-10 PROCEDURE — 87389 HIV-1 AG W/HIV-1&-2 AB AG IA: CPT

## 2024-12-10 PROCEDURE — 86780 TREPONEMA PALLIDUM: CPT

## 2024-12-10 NOTE — PROGRESS NOTES
Reyes Manjarrez is a 24 y.o. female who presents with a chief complaint of STI Screening (With blood work.  New partner /Check hair bump)      SUBJECTIVE  Patient presents for STD testing.  She has a new partner would like to be tested.  She also has some questions about her bone.  She went talk about hygiene with shaving and prevention of her bumps.    Past Medical History:   Diagnosis Date    Abnormal weight loss 10/21/2020    Weight loss    Adolescent idiopathic scoliosis, site unspecified 12/16/2020    Adolescent idiopathic scoliosis    Chlamydial infection, unspecified 02/17/2020    Chlamydia    Depression, unspecified 05/05/2022    Depression    Encounter for gynecological examination (general) (routine) without abnormal findings 05/06/2019    Encounter for well woman exam with routine gynecological exam    Encounter for pregnancy test, result negative 04/10/2019    Negative pregnancy test    Encounter for surveillance of injectable contraceptive 12/16/2019    Encounter for Depo-Provera contraception    Low back pain, unspecified 02/19/2021    Lower back pain    Lumbago with sciatica, right side 07/28/2022    Chronic midline low back pain with right-sided sciatica    Personal history of other diseases of the female genital tract 09/17/2019    History of menstrual disorder    Personal history of other infectious and parasitic diseases     History of trichomoniasis    Personal history of other specified conditions 12/03/2020    History of urinary frequency    Personal history of other specified conditions 08/11/2020    History of dysuria    Personal history of urinary (tract) infections 08/11/2020    History of urinary tract infection     Past Surgical History:   Procedure Laterality Date    OTHER SURGICAL HISTORY  05/11/2015    Posterior Segmental Instrumentation Total ___(3-6) Vertebrae    OTHER SURGICAL HISTORY  05/11/2015    Arthrodesis For Deform Post Ap Total ___ (1-6) Vertebrae    OTHER SURGICAL  HISTORY  2015    Spinal Surgery - Autograft Local (Same Incision)    OTHER SURGICAL HISTORY  2015    Spinal Surgery - Allograft Morselized     Social History     Socioeconomic History    Marital status: Single    Number of children: 0   Tobacco Use    Smoking status: Former     Types: Cigarettes     Passive exposure: Past    Smokeless tobacco: Never   Vaping Use    Vaping status: Every Day    Passive vaping exposure: Yes   Substance and Sexual Activity    Alcohol use: Yes    Drug use: Never    Sexual activity: Yes     Partners: Male, Female     Birth control/protection: Implant     Social Drivers of Health     Financial Resource Strain: Not on File (2022)    Received from [facility]    Financial Resource Strain     Financial Resource Strain: 0   Food Insecurity: Not on File (2024)    Received from [facility]    Food Insecurity     Food: 0   Transportation Needs: Not on File (2022)    Received from [facility]    Transportation Needs     Transportation: 0   Physical Activity: Not on File (2019)    Received from [facility], [facility]    Physical Activity     Physical Activity: 0   Stress: No Stress Concern Present (1/3/2024)    [employer] [place] [employer] - Occupational Stress Questionnaire     Feeling of Stress : Not at all   Social Connections: Not on File (2022)    Received from [facility]    Social Connections     Connectedness: 0   Intimate Partner Violence: Not At Risk (1/3/2024)    Humiliation, Afraid, Rape, and Kick questionnaire     Fear of Current or Ex-Partner: No     Emotionally Abused: No     Physically Abused: No     Sexually Abused: No   Housing Stability: Not on File (2022)    Received from [facility]    Housing Stability     Housin     No family history on file.    OB History    Para Term  AB Living   1       1 0   SAB IAB Ectopic Multiple Live Births   1       0      # Outcome Date GA Lbr Saul/2nd Weight Sex Type Anes PTL Lv   2018          "      OBJECTIVE  No Known Allergies   (Not in a hospital admission)       Review of Systems  History obtained from the patient  General ROS: negative  Psychological ROS: negative  Gastrointestinal ROS: no abdominal pain, change in bowel habits, or black or bloody stools  Musculoskeletal ROS: negative  Physical Exam  General Appearance: awake, alert, oriented, in no acute distress, well developed, well nourished, and in no acute distress  Skin: there are no suspicious lesions or rashes of concern, skin color, texture, turgor are normal; there are no bruises, rashes or lesions.  Head/Face: NCAT  Eyes: No gross abnormalities., PERRL, and EOMI  Neck: neck- supple, no mass, non-tender  Back: no pain to palpation  Abdomen: Soft, non-tender, normal bowel sounds; no bruits, organomegaly or masses.  Extremities: Extremities warm to touch, pink, with no edema.  Musculoskeletal: negative  Urogen: External genitalia: Normal, Vagina: Normal, Cervix: Normal, and Bimanual exam: Normal    /60 (BP Location: Right arm, Patient Position: Sitting, BP Cuff Size: Adult)   Ht 1.499 m (4' 11\")   Wt 49.4 kg (109 lb)   LMP 11/30/2024   BMI 22.02 kg/m²    Problem List Items Addressed This Visit    None  Visit Diagnoses       Concern about STD in female without diagnosis    -  Primary    Relevant Orders    C. trachomatis / N. gonorrhoeae, Amplified    Trichomonas vaginalis, Amplified               "

## 2024-12-18 ENCOUNTER — OFFICE VISIT (OUTPATIENT)
Dept: OBSTETRICS AND GYNECOLOGY | Facility: CLINIC | Age: 24
End: 2024-12-18
Payer: COMMERCIAL

## 2024-12-18 VITALS
DIASTOLIC BLOOD PRESSURE: 68 MMHG | WEIGHT: 113.6 LBS | BODY MASS INDEX: 22.9 KG/M2 | SYSTOLIC BLOOD PRESSURE: 102 MMHG | HEIGHT: 59 IN

## 2024-12-18 DIAGNOSIS — Z71.1 CONCERN ABOUT STD IN FEMALE WITHOUT DIAGNOSIS: Primary | ICD-10-CM

## 2024-12-18 PROCEDURE — 1036F TOBACCO NON-USER: CPT | Performed by: OBSTETRICS & GYNECOLOGY

## 2024-12-18 PROCEDURE — 3008F BODY MASS INDEX DOCD: CPT | Performed by: OBSTETRICS & GYNECOLOGY

## 2024-12-18 PROCEDURE — 87591 N.GONORRHOEAE DNA AMP PROB: CPT

## 2024-12-18 PROCEDURE — 99214 OFFICE O/P EST MOD 30 MIN: CPT | Performed by: OBSTETRICS & GYNECOLOGY

## 2024-12-18 PROCEDURE — 87491 CHLMYD TRACH DNA AMP PROBE: CPT

## 2024-12-18 NOTE — PROGRESS NOTES
Reyes Manjarrez is a 24 y.o. female who presents with a chief complaint of STI Screening (Std screening cultures only )      SUBJECTIVE  Patient presents wanting STD screening.  She has no symptoms or or contacts that she knows of.  She has no symptoms.  She only wants cultures done    Past Medical History:   Diagnosis Date    Abnormal weight loss 10/21/2020    Weight loss    Adolescent idiopathic scoliosis, site unspecified 12/16/2020    Adolescent idiopathic scoliosis    Chlamydial infection, unspecified 02/17/2020    Chlamydia    Depression, unspecified 05/05/2022    Depression    Encounter for gynecological examination (general) (routine) without abnormal findings 05/06/2019    Encounter for well woman exam with routine gynecological exam    Encounter for pregnancy test, result negative 04/10/2019    Negative pregnancy test    Encounter for surveillance of injectable contraceptive 12/16/2019    Encounter for Depo-Provera contraception    Low back pain, unspecified 02/19/2021    Lower back pain    Lumbago with sciatica, right side 07/28/2022    Chronic midline low back pain with right-sided sciatica    Personal history of other diseases of the female genital tract 09/17/2019    History of menstrual disorder    Personal history of other infectious and parasitic diseases     History of trichomoniasis    Personal history of other specified conditions 12/03/2020    History of urinary frequency    Personal history of other specified conditions 08/11/2020    History of dysuria    Personal history of urinary (tract) infections 08/11/2020    History of urinary tract infection     Past Surgical History:   Procedure Laterality Date    OTHER SURGICAL HISTORY  05/11/2015    Posterior Segmental Instrumentation Total ___(3-6) Vertebrae    OTHER SURGICAL HISTORY  05/11/2015    Arthrodesis For Deform Post Ap Total ___ (1-6) Vertebrae    OTHER SURGICAL HISTORY  05/11/2015    Spinal Surgery - Autograft Local (Same Incision)     OTHER SURGICAL HISTORY  2015    Spinal Surgery - Allograft Morselized     Social History     Socioeconomic History    Marital status: Single    Number of children: 0   Tobacco Use    Smoking status: Former     Types: Cigarettes     Passive exposure: Past    Smokeless tobacco: Never   Vaping Use    Vaping status: Every Day    Passive vaping exposure: Yes   Substance and Sexual Activity    Alcohol use: Yes    Drug use: Never    Sexual activity: Yes     Partners: Male, Female     Birth control/protection: Implant     Social Drivers of Health     Financial Resource Strain: Not on File (2022)    Received from [facility]    Financial Resource Strain     Financial Resource Strain: 0   Food Insecurity: Not on File (2024)    Received from [facility]    Food Insecurity     Food: 0   Transportation Needs: Not on File (2022)    Received from [facility]    Transportation Needs     Transportation: 0   Physical Activity: Not on File (2019)    Received from [facility], [facility]    Physical Activity     Physical Activity: 0   Stress: No Stress Concern Present (1/3/2024)    [employer] [place] [employer] - Occupational Stress Questionnaire     Feeling of Stress : Not at all   Social Connections: Not on File (2022)    Received from [facility]    Social Connections     Connectedness: 0   Intimate Partner Violence: Not At Risk (1/3/2024)    Humiliation, Afraid, Rape, and Kick questionnaire     Fear of Current or Ex-Partner: No     Emotionally Abused: No     Physically Abused: No     Sexually Abused: No   Housing Stability: Not on File (2022)    Received from [facility]    Housing Stability     Housin     No family history on file.    OB History    Para Term  AB Living   1       1 0   SAB IAB Ectopic Multiple Live Births   1       0      # Outcome Date GA Lbr Saul/2nd Weight Sex Type Anes PTL Lv   2018               OBJECTIVE  No Known Allergies   (Not in a hospital admission)       Review of  "Systems  History obtained from the patient  General ROS: negative  Psychological ROS: negative  Gastrointestinal ROS: no abdominal pain, change in bowel habits, or black or bloody stools  Musculoskeletal ROS: negative  Physical Exam  General Appearance: awake, alert, oriented, in no acute distress, well developed, well nourished, and in no acute distress  Skin: there are no suspicious lesions or rashes of concern, skin color, texture, turgor are normal; there are no bruises, rashes or lesions.  Head/Face: NCAT  Eyes: No gross abnormalities., PERRL, and EOMI  Neck: neck- supple, no mass, non-tender  Back: no pain to palpation  Abdomen: Soft, non-tender, normal bowel sounds; no bruits, organomegaly or masses.  Extremities: Extremities warm to touch, pink, with no edema.  Musculoskeletal: negative    /68   Ht 1.499 m (4' 11\")   Wt 51.5 kg (113 lb 9.6 oz)   LMP 12/09/2024 (Approximate)   BMI 22.94 kg/m²    Problem List Items Addressed This Visit    None  Visit Diagnoses       Concern about STD in female without diagnosis    -  Primary    Relevant Orders    C. trachomatis / N. gonorrhoeae, Amplified               "

## 2024-12-19 LAB
C TRACH RRNA SPEC QL NAA+PROBE: NEGATIVE
N GONORRHOEA DNA SPEC QL PROBE+SIG AMP: NEGATIVE

## 2025-01-08 ENCOUNTER — APPOINTMENT (OUTPATIENT)
Dept: OBSTETRICS AND GYNECOLOGY | Facility: CLINIC | Age: 25
End: 2025-01-08
Payer: COMMERCIAL

## 2025-01-28 ENCOUNTER — APPOINTMENT (OUTPATIENT)
Dept: DERMATOLOGY | Facility: CLINIC | Age: 25
End: 2025-01-28
Payer: COMMERCIAL

## 2025-01-28 DIAGNOSIS — L70.0 ACNE VULGARIS: Primary | ICD-10-CM

## 2025-01-28 DIAGNOSIS — D23.9 DERMATOFIBROMA: ICD-10-CM

## 2025-01-28 DIAGNOSIS — Q84.2 VELLUS HAIR CYST: ICD-10-CM

## 2025-01-28 PROCEDURE — 1036F TOBACCO NON-USER: CPT | Performed by: DERMATOLOGY

## 2025-01-28 PROCEDURE — 99204 OFFICE O/P NEW MOD 45 MIN: CPT | Performed by: DERMATOLOGY

## 2025-01-28 RX ORDER — ADAPALENE GEL USP, 0.3% 3 MG/G
GEL TOPICAL
Qty: 45 G | Refills: 2 | Status: CANCELLED | OUTPATIENT
Start: 2025-01-28

## 2025-01-28 RX ORDER — ADAPALENE 1 MG/G
GEL TOPICAL
Qty: 45 G | Refills: 2 | Status: CANCELLED | OUTPATIENT
Start: 2025-01-28

## 2025-01-28 RX ORDER — TRETINOIN 0.5 MG/G
CREAM TOPICAL
Qty: 45 G | Refills: 1 | Status: CANCELLED | OUTPATIENT
Start: 2025-01-28

## 2025-01-28 RX ORDER — TRETINOIN 0.25 MG/G
CREAM TOPICAL
Qty: 45 G | Refills: 1 | Status: SHIPPED | OUTPATIENT
Start: 2025-01-28

## 2025-01-28 NOTE — PROGRESS NOTES
Subjective     Reyes Manjarrez is a 24 y.o. female who presents for the following: Suspicious Skin Lesion (Red lesions/bumps- pt states around breasts where her bra sits, for many years, denies treatment. Chaperone offered and declined.  /Left shoulder lesion- pt states she squeezed and expelled contents. ).     Review of Systems:  No other skin or systemic complaints other than what is documented elsewhere in the note.    The following portions of the chart were reviewed this encounter and updated as appropriate:   Tobacco  Allergies  Meds  Problems  Med Hx  Surg Hx  Fam Hx            Objective   Well appearing patient in no apparent distress; mood and affect are within normal limits.    A focused skin examination was performed. All findings within normal limits unless otherwise noted below.    Assessment/Plan   1. Acne vulgaris  Chest - Medial (Center)  A few open and closed comedones    -Discussed the nature of the condition  -Discussed treatment options  -Recommend to begin topical retinoids; if just starting therapy with retinoid, start application of a very thin layer three nights per week as tolerated. If extreme redness or dryness occurs, hold medication until resolved and then restart at a greater interval. May apply moisturizer after application of retinoid. Advance toward nightly use as tolerated  -Recommend to start tretinoin 0.025% cream    Related Medications  tretinoin (Retin-A) 0.025 % cream  Apply a thin layer to affected area at bedtime as tolerated. Start 3 nights per week    2. Vellus hair cyst (2)  Left Abdomen (side) - Upper, Right Abdomen (side) - Upper  Numerous small subcutaneous papules    -Discussed the nature of the diagnosis  -Reassurance  -Discussed this condition is recalcitrant to treatment  -May trial tretinoin to the area (see above) but likely that these lesions will not resolve and slowly develop more over time      3. Dermatofibroma  Left Flank  Firm hyperpigmented  papule with positive dimple sign. On dermoscopy, there is a central scar-like area with a uniform peripheral pigment network.    -Discussed nature of condition  -Reassurance, recommend observation  -Recommend that the patient avoid trauma/injury/manipulation to the area to avoid the lesion enlarging  -Recommend the patient return for re-evaluation if the lesion enlarges, becomes painful or symptomatic, or is changing        Follow up as needed  Discussed if there are any changes or development of concerning symptoms (lesion/skin condition is changing, bleeding, enlarging, or worsening) the patient is to contact my office. The patient verbalizes understanding.    Ruth Childress MD  1/28/2025

## 2025-03-07 ENCOUNTER — OFFICE VISIT (OUTPATIENT)
Dept: OBSTETRICS AND GYNECOLOGY | Facility: CLINIC | Age: 25
End: 2025-03-07
Payer: COMMERCIAL

## 2025-03-07 VITALS
BODY MASS INDEX: 22.78 KG/M2 | HEIGHT: 59 IN | DIASTOLIC BLOOD PRESSURE: 64 MMHG | SYSTOLIC BLOOD PRESSURE: 106 MMHG | WEIGHT: 113 LBS

## 2025-03-07 DIAGNOSIS — Z01.419 WELL WOMAN EXAM: ICD-10-CM

## 2025-03-07 DIAGNOSIS — Z11.3 ROUTINE SCREENING FOR STI (SEXUALLY TRANSMITTED INFECTION): ICD-10-CM

## 2025-03-07 PROCEDURE — 1036F TOBACCO NON-USER: CPT | Performed by: OBSTETRICS & GYNECOLOGY

## 2025-03-07 PROCEDURE — 3008F BODY MASS INDEX DOCD: CPT | Performed by: OBSTETRICS & GYNECOLOGY

## 2025-03-07 PROCEDURE — 99395 PREV VISIT EST AGE 18-39: CPT | Performed by: OBSTETRICS & GYNECOLOGY

## 2025-03-07 ASSESSMENT — PAIN SCALES - GENERAL: PAINLEVEL_OUTOF10: 0-NO PAIN

## 2025-03-07 NOTE — PROGRESS NOTES
Subjective   Patient ID: Reyes Manjarrez is a 24 y.o. female who presents for Well Women Visit (PT is here for Annual Pap.).  HPI  As contained in the chief complaint  Review of Systems  10 systems have been reviewed and are negative and noncontributory to the patient current ailments.    Objective   Physical Exam  Vital signs reviewed    Constitutional: Well nourished, well developed, looks like stated age.  She is sitting comfortably on the exam table in no apparent distress  ENMT: Mucous membranes moist, no apparent lesions   Skin: Warm and dry, no lesions, no rashes  Eyes:  Normal external exam  Head/Neck: Neck supple, no bruits, thyroid symmetrical ,normal size and normal consistency  Cardiovascular: RRR without murmur, S3 or S4, 2+ equal pulses of the extremities.  Respiratory/Thorax:  breathing comfortably, no dyspnea, good chest expansion   Extremities: No deformities.  Edema, discoloration, or pain in BLE, no joint swelling, normal strength  Neurological:  A/Ox3, conversational   Psychiatric:  Alert, pleasant and cordial, age-appropriate, not anxious, or depressed appearing  Breasts: Normal appearance, no skin changes or nipple discharge.  Palpation of the breast and axillae: No no masses palpable, no organomegaly, and no axillary lymphadenopathy  Gastrointestinal: Soft, non distended, bowel sound normal pitch and intensity,no palpable abnormal masses  Genitourinary:  External genitalia: Isolated follicular abscess drained at patient's request                               - Uterus: AV/AF NSSC, mobile and nontender                                -The adnexal areas are free of tenderness or mass                                -There are no cervical lesions; there is no cervical motion tenderness                                -Vagina without lesions, mucosa pink and well-hydrated, discharge is physiologic.                                   -Inspection of Perianal Area: Normal    Assessment/Plan    Diagnoses and all orders for this visit:  Well woman exam  -     THINPREP PAP TEST        -STD culture obtained         Conrad Thompson DO 03/07/25 2:19 PM

## 2025-03-08 LAB
C TRACH RRNA SPEC QL NAA+PROBE: NOT DETECTED
N GONORRHOEA RRNA SPEC QL NAA+PROBE: NOT DETECTED
QUEST GC CT AMPLIFIED (ALWAYS MESSAGE): NORMAL
T VAGINALIS RRNA SPEC QL NAA+PROBE: NOT DETECTED

## 2025-03-24 LAB
CYTOLOGY CMNT CVX/VAG CYTO-IMP: NORMAL
LAB AP CONTRACEPTIVE HISTORY: NORMAL
LAB AP HPV GENOTYPE QUESTION: YES
LAB AP HPV HR: NORMAL
LABORATORY COMMENT REPORT: NORMAL
LMP START DATE: NORMAL
PATH REPORT.TOTAL CANCER: NORMAL

## 2025-06-16 ENCOUNTER — APPOINTMENT (OUTPATIENT)
Dept: OBSTETRICS AND GYNECOLOGY | Facility: CLINIC | Age: 25
End: 2025-06-16
Payer: COMMERCIAL

## 2025-06-16 VITALS
BODY MASS INDEX: 22.38 KG/M2 | WEIGHT: 111 LBS | SYSTOLIC BLOOD PRESSURE: 100 MMHG | DIASTOLIC BLOOD PRESSURE: 65 MMHG | HEIGHT: 59 IN

## 2025-06-16 DIAGNOSIS — Z30.46 ENCOUNTER FOR SURVEILLANCE OF IMPLANTABLE SUBDERMAL CONTRACEPTIVE: ICD-10-CM

## 2025-06-16 DIAGNOSIS — Z11.3 SCREEN FOR STD (SEXUALLY TRANSMITTED DISEASE): Primary | ICD-10-CM

## 2025-06-16 NOTE — PROGRESS NOTES
Subjective   Patient ID: Reyes Manjarrez is a 24 y.o. female who presents for Est patient visit (Wants to be checked for HPV. STI testing requested. Denies any discharge, odor, itching.).    Chaperone present for entire exam. Latoya Gupta MA    HPI  Patient wants be checked for STDs.  Has new partner.  Not having any symptoms.  Has Nexplanon in place since 2023.    Review of Systems  Negative review of symptoms    Objective   Physical Exam  Vitals reviewed.   Constitutional:       Appearance: Normal appearance. She is normal weight.   Cardiovascular:      Rate and Rhythm: Normal rate and regular rhythm.   Pulmonary:      Effort: Pulmonary effort is normal.      Breath sounds: Normal breath sounds.   Abdominal:      General: Abdomen is flat. Bowel sounds are normal. There is no distension.      Palpations: Abdomen is soft. There is no mass.      Tenderness: There is no abdominal tenderness.   Genitourinary:     Comments: External genitalia unremarkable  Vagina clear  Cervix closed uterus normal anteverted  Adnexa masses or tenderness  Perineum without lesions or swelling    Nexplanon in place left arm  Musculoskeletal:      Cervical back: Normal range of motion and neck supple.   Neurological:      General: No focal deficit present.      Mental Status: She is alert.   Psychiatric:         Mood and Affect: Mood normal.         Behavior: Behavior normal.         Thought Content: Thought content normal.         Judgment: Judgment normal.         Assessment/Plan   Diagnoses and all orders for this visit:  Screen for STD (sexually transmitted disease)  -     C. trachomatis / N. gonorrhoeae, Amplified, Urogenital; Future  -     Hepatitis B Surface Antigen; Future  -     Hepatitis C Antibody; Future  -     HIV 1/2 Antigen/Antibody Screen with Reflex to Confirmation; Future  -     Syphilis Screen with Reflex; Future  -     Trichomonas vaginalis, Amplified; Future  Encounter for surveillance of implantable subdermal  contraceptive     Unremarkable exam.  Routine STD testing due to new partner.  Patient was concerned about HPV.  Chart reviewed which shows negative HPV on Pap approximately month and a half ago.  Patient has been vaccinated.  Nexplanon in place.  Still having cycles slightly irregular.  Return to office in March 2026 for annual exam    Devyn Oneil MD 06/16/25 10:41 AM

## 2025-06-18 LAB
C TRACH RRNA SPEC QL NAA+PROBE: NOT DETECTED
HBV SURFACE AG SERPL QL IA: NORMAL
HCV AB SERPL QL IA: NORMAL
HIV 1+2 AB+HIV1 P24 AG SERPL QL IA: NORMAL
N GONORRHOEA RRNA SPEC QL NAA+PROBE: NOT DETECTED
QUEST GC CT AMPLIFIED (ALWAYS MESSAGE): NORMAL
T PALLIDUM AB SER QL IA: NEGATIVE
T VAGINALIS RRNA SPEC QL NAA+PROBE: NOT DETECTED